# Patient Record
Sex: MALE | Race: WHITE | NOT HISPANIC OR LATINO | Employment: FULL TIME | RURAL
[De-identification: names, ages, dates, MRNs, and addresses within clinical notes are randomized per-mention and may not be internally consistent; named-entity substitution may affect disease eponyms.]

---

## 2023-02-17 RX ORDER — ATORVASTATIN CALCIUM 20 MG/1
20 TABLET, FILM COATED ORAL EVERY EVENING
COMMUNITY
Start: 2023-02-10 | End: 2023-03-01 | Stop reason: ALTCHOICE

## 2023-02-17 RX ORDER — METOPROLOL SUCCINATE 50 MG/1
1 TABLET, EXTENDED RELEASE ORAL DAILY
COMMUNITY
Start: 2023-01-10 | End: 2023-02-17 | Stop reason: SDUPTHER

## 2023-02-17 RX ORDER — TORSEMIDE 20 MG/1
TABLET ORAL
COMMUNITY
Start: 2023-02-06

## 2023-02-17 RX ORDER — METOPROLOL SUCCINATE 50 MG/1
50 TABLET, EXTENDED RELEASE ORAL DAILY
Qty: 90 TABLET | Refills: 3 | Status: SHIPPED | OUTPATIENT
Start: 2023-02-17 | End: 2023-03-01 | Stop reason: ALTCHOICE

## 2023-02-17 RX ORDER — POTASSIUM CHLORIDE 20 MEQ/1
1 TABLET, EXTENDED RELEASE ORAL DAILY
COMMUNITY
Start: 2023-02-09 | End: 2023-03-01 | Stop reason: ALTCHOICE

## 2023-02-17 RX ORDER — SPIRONOLACTONE 25 MG/1
1 TABLET ORAL DAILY
COMMUNITY
Start: 2023-02-10

## 2023-02-17 RX ORDER — BLOOD SUGAR DIAGNOSTIC
1 STRIP MISCELLANEOUS 4 TIMES DAILY
COMMUNITY
Start: 2022-12-26 | End: 2023-03-01 | Stop reason: ALTCHOICE

## 2023-03-01 ENCOUNTER — OFFICE VISIT (OUTPATIENT)
Dept: CARDIOLOGY | Facility: CLINIC | Age: 58
End: 2023-03-01
Payer: COMMERCIAL

## 2023-03-01 VITALS
SYSTOLIC BLOOD PRESSURE: 132 MMHG | DIASTOLIC BLOOD PRESSURE: 70 MMHG | WEIGHT: 234 LBS | BODY MASS INDEX: 33.5 KG/M2 | OXYGEN SATURATION: 93 % | HEART RATE: 88 BPM | HEIGHT: 70 IN

## 2023-03-01 DIAGNOSIS — I27.20 PULMONARY HYPERTENSION: ICD-10-CM

## 2023-03-01 DIAGNOSIS — I47.1 PAROXYSMAL SVT (SUPRAVENTRICULAR TACHYCARDIA): Primary | ICD-10-CM

## 2023-03-01 PROBLEM — J96.01 ACUTE RESPIRATORY FAILURE WITH HYPOXIA: Status: ACTIVE | Noted: 2022-06-17

## 2023-03-01 PROBLEM — J39.8 TRACHEAL STENOSIS: Status: ACTIVE | Noted: 2022-10-14

## 2023-03-01 PROBLEM — Z87.891 EX-SMOKER: Status: ACTIVE | Noted: 2023-03-01

## 2023-03-01 PROBLEM — J38.6 SUBGLOTTIC STENOSIS: Status: ACTIVE | Noted: 2022-09-02

## 2023-03-01 PROBLEM — I47.10 PAROXYSMAL SVT (SUPRAVENTRICULAR TACHYCARDIA): Status: ACTIVE | Noted: 2023-03-01

## 2023-03-01 PROCEDURE — 99203 OFFICE O/P NEW LOW 30 MIN: CPT | Performed by: NURSE PRACTITIONER

## 2023-03-01 RX ORDER — ATORVASTATIN CALCIUM 20 MG/1
20 TABLET, FILM COATED ORAL DAILY
COMMUNITY

## 2023-03-01 RX ORDER — METOPROLOL SUCCINATE 50 MG/1
50 TABLET, EXTENDED RELEASE ORAL DAILY
Qty: 90 TABLET | Refills: 1 | Status: SHIPPED | OUTPATIENT
Start: 2023-03-01

## 2023-03-01 RX ORDER — POTASSIUM CHLORIDE 20 MEQ/1
40 TABLET, EXTENDED RELEASE ORAL
COMMUNITY

## 2023-03-01 RX ORDER — INSULIN GLARGINE 100 [IU]/ML
10 INJECTION, SOLUTION SUBCUTANEOUS
COMMUNITY
Start: 2022-07-16 | End: 2023-07-16

## 2023-03-01 NOTE — PROGRESS NOTES
[facility] Provider Note     Date:   2023   Name: Sloan Yee  :   1965  PCP: Diego Sawyer MD    Chief Complaint   Patient presents with   • Rapid Heart Rate     3 month follow up       Subjective     History of Present Illness  Sloan Yee is a 57 y.o. male who presents today for follow-up on nonsustained SVT.  He no longer has a trach but has a T-tube.  He denies any chest pain, shortness of air, dizziness, edema, palpitations, or syncope.  He reports that he feels the best he has felt in a long time.  In QRS he has a known history of hiatal hernia, COPD, hypertension, polycythemia, cirrhosis, right-sided heart failure, secondary pulmonary hypertension secondary to COPD.    He reports he stopped smoking since May of last year and feels great.  EKG 2022 sinus tachycardia 2022 echo from  EF 51% with grade 1 diastolic dysfunction.  Holter monitor August 3, 2022    No Known Allergies    Current Outpatient Medications:   •  atorvastatin (LIPITOR) 20 MG tablet, Take 1 tablet by mouth Daily., Disp: , Rfl:   •  insulin glargine (LANTUS, SEMGLEE) 100 UNIT/ML injection, Inject 10 Units under the skin into the appropriate area as directed., Disp: , Rfl:   •  metFORMIN (GLUCOPHAGE) 500 MG tablet, Take 1 tablet by mouth., Disp: , Rfl:   •  metoprolol succinate XL (TOPROL-XL) 50 MG 24 hr tablet, Take 1 tablet by mouth Daily., Disp: 90 tablet, Rfl: 1  •  potassium chloride (K-DUR,KLOR-CON) 20 MEQ CR tablet, Take 2 tablets by mouth., Disp: , Rfl:   •  spironolactone (ALDACTONE) 25 MG tablet, Take 1 tablet by mouth Daily., Disp: , Rfl:   •  torsemide (DEMADEX) 20 MG tablet, take 2 tablets by mouth in the morning, Disp: , Rfl:     Past Medical History:   Diagnosis Date   • Chronic systolic (congestive) heart failure (HCC)    • Cirrhosis (HCC)    • COPD (chronic obstructive pulmonary disease) (HCC)    • Essential (primary) hypertension    • Hiatal hernia    •  "Hypotension, unspecified    • Localized swelling, mass and lump, lower limb, bilateral    • Nicotine dependence, unspecified, uncomplicated    • Other emphysema (HCC)    • Other long term (current) drug therapy    • Polycythemia    • Pulmonary hypertension, unspecified (HCC)    • Right-sided heart failure (HCC)    • Secondary pulmonary arterial hypertension (HCC)     SCONDARY TO COPD   • SVT (supraventricular tachycardia) (HCC)    • Ventricular tachycardia       History reviewed. No pertinent surgical history.  Family History   Problem Relation Age of Onset   • Brain cancer Mother      Social History     Socioeconomic History   • Marital status: Single   • Number of children: 2   Tobacco Use   • Smoking status: Former     Packs/day: 1.00     Years: 37.00     Pack years: 37.00     Types: Cigarettes     Quit date: 2022     Years since quittin.8   Substance and Sexual Activity   • Alcohol use: Not Currently     Comment: RARE PER WEEK   • Drug use: Not Currently       Objective     Vital Signs:  /70 (BP Location: Left arm)   Pulse 88   Ht 177.8 cm (70\")   Wt 106 kg (234 lb)   SpO2 93%   BMI 33.58 kg/m²   Estimated body mass index is 33.58 kg/m² as calculated from the following:    Height as of this encounter: 177.8 cm (70\").    Weight as of this encounter: 106 kg (234 lb).             Physical Exam  Constitutional:       Appearance: Normal appearance. He is well-developed.   HENT:      Head: Normocephalic and atraumatic.   Eyes:      General: No scleral icterus.     Pupils: Pupils are equal, round, and reactive to light.   Neck:      Vascular: No carotid bruit.      Comments: T-Tube in place  Cardiovascular:      Rate and Rhythm: Normal rate and regular rhythm.      Pulses: Normal pulses.           Radial pulses are 2+ on the right side and 2+ on the left side.        Dorsalis pedis pulses are 2+ on the right side and 2+ on the left side.        Posterior tibial pulses are 2+ on the right side and 2+ " on the left side.      Heart sounds: Normal heart sounds. No murmur heard.  Pulmonary:      Breath sounds: Normal breath sounds. No wheezing or rhonchi.   Musculoskeletal:      Right lower leg: No edema.      Left lower leg: No edema.   Skin:     Capillary Refill: Capillary refill takes less than 2 seconds.      Coloration: Skin is not cyanotic.      Nails: There is no clubbing.   Neurological:      Mental Status: He is alert and oriented to person, place, and time.      Motor: No weakness.      Gait: Gait normal.   Psychiatric:         Mood and Affect: Mood normal.         Behavior: Behavior is cooperative.         Thought Content: Thought content normal.         Cognition and Memory: Memory normal.                     Assessment and Plan     Diagnoses and all orders for this visit:    1. Paroxysmal SVT (supraventricular tachycardia) (HCC) (Primary)  Assessment & Plan:  Unsustained SVT.  Doing well on metoprolol.  Plan to continue.  Old system says history of right-sided heart failure.  With a echo at  approximately November 2022?  We will plan to repeat in November or December of this year.    Orders:  -     metoprolol succinate XL (TOPROL-XL) 50 MG 24 hr tablet; Take 1 tablet by mouth Daily.  Dispense: 90 tablet; Refill: 1    2. Pulmonary hypertension (HCC)  Assessment & Plan:  Pulmonary hypertension likely secondary to COPD.  We will update echo in November or December.  On diuretics.    Orders:  -     metoprolol succinate XL (TOPROL-XL) 50 MG 24 hr tablet; Take 1 tablet by mouth Daily.  Dispense: 90 tablet; Refill: 1      Recommendations: ER if symptoms increase, Sleep hygiene discussed, Stop cigarettes, Limit caffeine and Report if any new/changing symptoms immediately          Follow Up  Return in about 6 months (around 9/1/2023) for nSVT.  Patient was given instructions and counseling regarding his condition or for health maintenance advice. Please see specific information pulled into the AVS if  appropriate.

## 2023-03-01 NOTE — ASSESSMENT & PLAN NOTE
Unsustained SVT.  Doing well on metoprolol.  Plan to continue.  Old system says history of right-sided heart failure.  With a echo at  approximately November 2022?  We will plan to repeat in November or December of this year.

## 2023-03-01 NOTE — ASSESSMENT & PLAN NOTE
Pulmonary hypertension likely secondary to COPD.  We will update echo in November or December.  On diuretics.

## 2023-09-12 ENCOUNTER — OFFICE VISIT (OUTPATIENT)
Dept: CARDIOLOGY | Facility: CLINIC | Age: 58
End: 2023-09-12
Payer: COMMERCIAL

## 2023-09-12 VITALS
OXYGEN SATURATION: 91 % | DIASTOLIC BLOOD PRESSURE: 72 MMHG | SYSTOLIC BLOOD PRESSURE: 102 MMHG | HEIGHT: 70 IN | BODY MASS INDEX: 34.07 KG/M2 | HEART RATE: 81 BPM | WEIGHT: 238 LBS

## 2023-09-12 DIAGNOSIS — I27.20 PULMONARY HYPERTENSION: ICD-10-CM

## 2023-09-12 DIAGNOSIS — I47.1 PAROXYSMAL SVT (SUPRAVENTRICULAR TACHYCARDIA): ICD-10-CM

## 2023-09-12 PROCEDURE — 99214 OFFICE O/P EST MOD 30 MIN: CPT | Performed by: NURSE PRACTITIONER

## 2023-09-12 RX ORDER — TORSEMIDE 20 MG/1
TABLET ORAL
Qty: 180 TABLET | Refills: 1 | Status: SHIPPED | OUTPATIENT
Start: 2023-09-12

## 2023-09-12 RX ORDER — ATORVASTATIN CALCIUM 20 MG/1
20 TABLET, FILM COATED ORAL DAILY
Qty: 90 TABLET | Refills: 1 | Status: SHIPPED | OUTPATIENT
Start: 2023-09-12

## 2023-09-12 RX ORDER — POTASSIUM CHLORIDE 20 MEQ/1
40 TABLET, EXTENDED RELEASE ORAL DAILY
Qty: 180 TABLET | Refills: 1 | Status: SHIPPED | OUTPATIENT
Start: 2023-09-12

## 2023-09-12 RX ORDER — METOPROLOL SUCCINATE 50 MG/1
50 TABLET, EXTENDED RELEASE ORAL DAILY
Qty: 90 TABLET | Refills: 1 | Status: SHIPPED | OUTPATIENT
Start: 2023-09-12

## 2023-09-12 RX ORDER — SPIRONOLACTONE 25 MG/1
25 TABLET ORAL DAILY
Qty: 90 TABLET | Refills: 1 | Status: SHIPPED | OUTPATIENT
Start: 2023-09-12

## 2023-09-12 NOTE — PROGRESS NOTES
[facility] Provider Note     Date:   2023   Name: Sloan Yee  :   1965  PCP: Diego Sawyer MD    Chief Complaint   Patient presents with    Follow-up     SVT       Subjective     History of Present Illness  Sloan Yee is a 58 y.o. male who presents today for follow-up on nonsustained SVT.   ENT switches him back from her trach and T-tube.  He currently has T-tube in place.  He denies any chest pain, shortness of air, dizziness, edema, palpitations, or syncope.    Blood pressure little low today but patient denies any symptoms.  Heart rate at goal.  He is still taking potassium with his diuretics but has not had labs in over a year.  I offered him lab work today but he reports he really needs to get into see Dr. Sawyer.  I strongly encouraged him to make a follow-up with Dr. Sawyer in the next few weeks to obtain labs.    It is unclear if  will be ordering and following echo or if we will.  Next echo due around 2023.  If patient has not had repeat echo when we see him back at follow-up we will order it at that time.    Cardiology and sleep related problem list    1.  Nonsustained SVT  2.  Trach/T-tube  3.  Stop smoking in May 2022  4.  Hypertension  5.  History of right-sided heart failure  6.  Secondary pulmonary hypertension R/T COPD    2023 EKG for surgery at     2022 echo from -EF 51% with grade 1 diastolic dysfunction.     CCTA.  Mild coronary calcification with Charmaine score of 63.  Vascular age 71.  Mild stenosis in the proximal LAD due to calcified plaque.  Minimal stenosis in the mid LAD.  CAD RADS 2.  Dilated right ventricle and right atrium.  The main pulmonary artery is dilated 4.5 x 4.2 cm.  Left peribronchial calcified lymph nodes.    8/3/2022 Holter    No Known Allergies    Current Outpatient Medications:     atorvastatin (LIPITOR) 20 MG tablet, Take 1 tablet by mouth Daily., Disp: 90 tablet, Rfl: 1    metFORMIN  (GLUCOPHAGE) 500 MG tablet, Take 1 tablet by mouth., Disp: , Rfl:     metoprolol succinate XL (TOPROL-XL) 50 MG 24 hr tablet, Take 1 tablet by mouth Daily., Disp: 90 tablet, Rfl: 1    potassium chloride (K-DUR,KLOR-CON) 20 MEQ CR tablet, Take 2 tablets by mouth Daily., Disp: 180 tablet, Rfl: 1    spironolactone (ALDACTONE) 25 MG tablet, Take 1 tablet by mouth Daily., Disp: 90 tablet, Rfl: 1    torsemide (DEMADEX) 20 MG tablet, Take 2 tablets by mouth in the morning, Disp: 180 tablet, Rfl: 1    insulin glargine (LANTUS, SEMGLEE) 100 UNIT/ML injection, Inject 10 Units under the skin into the appropriate area as directed., Disp: , Rfl:     Past Medical History:   Diagnosis Date    Chronic systolic (congestive) heart failure     Cirrhosis     COPD (chronic obstructive pulmonary disease)     Essential (primary) hypertension     Hiatal hernia     Hypotension, unspecified     Localized swelling, mass and lump, lower limb, bilateral     Nicotine dependence, unspecified, uncomplicated     Other emphysema     Other long term (current) drug therapy     Polycythemia     Pulmonary hypertension, unspecified     Right-sided heart failure     Secondary pulmonary arterial hypertension     SCONDARY TO COPD    SVT (supraventricular tachycardia)     Ventricular tachycardia       History reviewed. No pertinent surgical history.  Family History   Problem Relation Age of Onset    Brain cancer Mother      Social History     Socioeconomic History    Marital status: Single    Number of children: 2   Tobacco Use    Smoking status: Former     Packs/day: 1.00     Years: 37.00     Pack years: 37.00     Types: Cigarettes     Quit date: 2022     Years since quittin.3     Passive exposure: Past    Smokeless tobacco: Never   Vaping Use    Vaping Use: Never used   Substance and Sexual Activity    Alcohol use: Not Currently     Comment: RARE PER WEEK    Drug use: Not Currently    Sexual activity: Defer       Objective     Vital Signs:  BP  "102/72 (BP Location: Left arm)   Pulse 81   Ht 177.8 cm (70\")   Wt 108 kg (238 lb)   SpO2 91%   BMI 34.15 kg/m²   Estimated body mass index is 34.15 kg/m² as calculated from the following:    Height as of this encounter: 177.8 cm (70\").    Weight as of this encounter: 108 kg (238 lb).             Physical Exam  Vitals reviewed.   Constitutional:       Appearance: Normal appearance. He is well-developed.   HENT:      Head: Normocephalic and atraumatic.      Comments: T-Tube in place  Eyes:      General: No scleral icterus.     Pupils: Pupils are equal, round, and reactive to light.   Neck:      Vascular: No carotid bruit.   Cardiovascular:      Rate and Rhythm: Normal rate and regular rhythm.      Pulses: Normal pulses.           Radial pulses are 2+ on the right side and 2+ on the left side.        Dorsalis pedis pulses are 2+ on the right side and 2+ on the left side.        Posterior tibial pulses are 2+ on the right side and 2+ on the left side.      Heart sounds: Normal heart sounds. No murmur heard.  Pulmonary:      Breath sounds: Normal breath sounds. No wheezing or rhonchi.   Musculoskeletal:         General: Normal range of motion.      Right lower leg: No edema.      Left lower leg: No edema.   Skin:     General: Skin is warm and dry.      Capillary Refill: Capillary refill takes less than 2 seconds.      Coloration: Skin is not cyanotic.      Nails: There is no clubbing.   Neurological:      Mental Status: He is alert and oriented to person, place, and time.      Motor: No weakness.      Gait: Gait normal.   Psychiatric:         Mood and Affect: Mood normal.         Behavior: Behavior is cooperative.         Thought Content: Thought content normal.         Cognition and Memory: Memory normal.                   Assessment and Plan     Diagnoses and all orders for this visit:    1. Paroxysmal SVT (supraventricular tachycardia)  Comments:  Rate controlled.  Asymptomatic.  Continue metoprolol.  EKG at  " 4/19/2023  Orders:  -     metoprolol succinate XL (TOPROL-XL) 50 MG 24 hr tablet; Take 1 tablet by mouth Daily.  Dispense: 90 tablet; Refill: 1    2. Pulmonary hypertension  Comments:  Dilated right ventricle and right atrium.  Main pulmonary artery dilated.  Update echo around December 2023.  EF 51%  Orders:  -     metoprolol succinate XL (TOPROL-XL) 50 MG 24 hr tablet; Take 1 tablet by mouth Daily.  Dispense: 90 tablet; Refill: 1    Other orders  -     atorvastatin (LIPITOR) 20 MG tablet; Take 1 tablet by mouth Daily.  Dispense: 90 tablet; Refill: 1  -     potassium chloride (K-DUR,KLOR-CON) 20 MEQ CR tablet; Take 2 tablets by mouth Daily.  Dispense: 180 tablet; Refill: 1  -     spironolactone (ALDACTONE) 25 MG tablet; Take 1 tablet by mouth Daily.  Dispense: 90 tablet; Refill: 1  -     torsemide (DEMADEX) 20 MG tablet; Take 2 tablets by mouth in the morning  Dispense: 180 tablet; Refill: 1        Recommendations: ER if symptoms increase and Report if any new/changing symptoms immediately              Follow Up  Return in about 6 months (around 3/12/2024) for SVT.    Christiane Yee, APRTAINA   09/12/2023     Please note that this explicitly excludes time spent on other separate billable services such as performing procedures or test interpretation, when applicable.    This note was created using dictation software which occasionally transcribes nonsensical phrases. Please contact the provider if any clarification is needed.

## 2024-03-04 RX ORDER — TORSEMIDE 20 MG/1
TABLET ORAL
Qty: 180 TABLET | Refills: 0 | Status: SHIPPED | OUTPATIENT
Start: 2024-03-04

## 2024-03-12 ENCOUNTER — OFFICE VISIT (OUTPATIENT)
Dept: CARDIOLOGY | Facility: CLINIC | Age: 59
End: 2024-03-12
Payer: COMMERCIAL

## 2024-03-12 VITALS
HEIGHT: 70 IN | SYSTOLIC BLOOD PRESSURE: 132 MMHG | WEIGHT: 252 LBS | BODY MASS INDEX: 36.08 KG/M2 | DIASTOLIC BLOOD PRESSURE: 76 MMHG | HEART RATE: 86 BPM | OXYGEN SATURATION: 92 %

## 2024-03-12 DIAGNOSIS — E78.5 HYPERLIPIDEMIA LDL GOAL <100: ICD-10-CM

## 2024-03-12 DIAGNOSIS — I47.10 PAROXYSMAL SVT (SUPRAVENTRICULAR TACHYCARDIA): ICD-10-CM

## 2024-03-12 DIAGNOSIS — I27.20 PULMONARY HYPERTENSION: ICD-10-CM

## 2024-03-12 PROCEDURE — 99214 OFFICE O/P EST MOD 30 MIN: CPT | Performed by: NURSE PRACTITIONER

## 2024-03-13 ENCOUNTER — TELEPHONE (OUTPATIENT)
Dept: CARDIOLOGY | Facility: CLINIC | Age: 59
End: 2024-03-13
Payer: COMMERCIAL

## 2024-03-13 PROBLEM — I50.9 CONGESTIVE HEART FAILURE: Status: ACTIVE | Noted: 2023-12-13

## 2024-03-13 PROBLEM — E78.5 HYPERLIPIDEMIA LDL GOAL <100: Status: ACTIVE | Noted: 2024-03-13

## 2024-03-13 PROBLEM — K42.9 UMBILICAL HERNIA WITHOUT OBSTRUCTION AND WITHOUT GANGRENE: Status: ACTIVE | Noted: 2023-12-13

## 2024-03-13 PROBLEM — E13.9 DIABETES 1.5, MANAGED AS TYPE 2: Status: ACTIVE | Noted: 2023-12-13

## 2024-03-13 RX ORDER — METOPROLOL SUCCINATE 50 MG/1
50 TABLET, EXTENDED RELEASE ORAL DAILY
Qty: 90 TABLET | Refills: 1 | Status: SHIPPED | OUTPATIENT
Start: 2024-03-13

## 2024-03-13 RX ORDER — SPIRONOLACTONE 25 MG/1
25 TABLET ORAL DAILY
Qty: 90 TABLET | Refills: 1 | Status: SHIPPED | OUTPATIENT
Start: 2024-03-13

## 2024-03-13 RX ORDER — TORSEMIDE 20 MG/1
TABLET ORAL
Qty: 180 TABLET | Refills: 1 | Status: SHIPPED | OUTPATIENT
Start: 2024-03-13

## 2024-03-13 RX ORDER — POTASSIUM CHLORIDE 20 MEQ/1
40 TABLET, EXTENDED RELEASE ORAL DAILY
Qty: 180 TABLET | Refills: 1 | Status: SHIPPED | OUTPATIENT
Start: 2024-03-13

## 2024-03-13 NOTE — TELEPHONE ENCOUNTER
----- Message from Marjorie Berger MA sent at 3/13/2024 12:25 PM EDT -----  Regarding: RE: Lipids  Called PCP no lipidsd Pt was new 20222 not been back but once.  ----- Message -----  From: Christiane Yee APRN  Sent: 3/13/2024  12:16 PM EDT  To: [facility]  Subject: Lipids                                           Any lipids from PCP please.

## 2024-03-13 NOTE — PROGRESS NOTES
[facility] Provider Note     Date:   2024   Name: Sloan Yee  :   1965  PCP: Diego Sawyer MD    Chief Complaint   Patient presents with    Follow-up    Rapid Heart Rate    Hypertension       Subjective     History of Present Illness  Sloan Yee is a 58 y.o. male who presents today for follow-up on nonsustained SVT, hypertension, and right-sided heart failure.  He stopped smoking in May 2022.  I am very proud of him.    He declines updating his echo as he denies any chest pain, shortness of air, palpitations, edema, dizziness, or syncope.  He said he feels great.    We will need to get updated lipid results from PCP.    Blood pressure at goal.    Will see patient back in 6 months.    Cardiology and sleep related problem list    1.  Nonsustained SVT  2.  Trach/T-tube  3.  Stop smoking in May 2022  4.  Hypertension  5.  History of right-sided heart failure  6.  Secondary pulmonary hypertension R/T COPD    2023 EKG for surgery at     2022 echo from -EF 51% with grade 1 diastolic dysfunction.     CCTA.  Mild coronary calcification with Charmaine score of 63.  Vascular age 71.  Mild stenosis in the proximal LAD due to calcified plaque.  Minimal stenosis in the mid LAD.  CAD RADS 2.  Dilated right ventricle and right atrium.  The main pulmonary artery is dilated 4.5 x 4.2 cm.  Left peribronchial calcified lymph nodes.    8/3/2022 Holter    No Known Allergies    Current Outpatient Medications:     atorvastatin (LIPITOR) 20 MG tablet, Take 1 tablet by mouth Daily., Disp: 90 tablet, Rfl: 1    metFORMIN (GLUCOPHAGE) 500 MG tablet, Take 1 tablet by mouth., Disp: , Rfl:     metoprolol succinate XL (TOPROL-XL) 50 MG 24 hr tablet, Take 1 tablet by mouth Daily., Disp: 90 tablet, Rfl: 1    potassium chloride (K-DUR,KLOR-CON) 20 MEQ CR tablet, Take 2 tablets by mouth Daily., Disp: 180 tablet, Rfl: 1    spironolactone (ALDACTONE) 25 MG tablet, Take 1 tablet  "by mouth Daily., Disp: 90 tablet, Rfl: 1    torsemide (DEMADEX) 20 MG tablet, TAKE 2 TABLETS BY MOUTH IN THE MORNING, Disp: 180 tablet, Rfl: 0    insulin glargine (LANTUS, SEMGLEE) 100 UNIT/ML injection, Inject 10 Units under the skin into the appropriate area as directed., Disp: , Rfl:     Past Medical History:   Diagnosis Date    Chronic systolic (congestive) heart failure     Cirrhosis     COPD (chronic obstructive pulmonary disease)     Essential (primary) hypertension     Hiatal hernia     Hypotension, unspecified     Localized swelling, mass and lump, lower limb, bilateral     Nicotine dependence, unspecified, uncomplicated     Other emphysema     Other long term (current) drug therapy     Polycythemia     Pulmonary hypertension, unspecified     Right-sided heart failure     Secondary pulmonary arterial hypertension     SCONDARY TO COPD    Ventricular tachycardia       Past Surgical History:   Procedure Laterality Date    TRACHELECTOMY       Family History   Problem Relation Age of Onset    Brain cancer Mother      Social History     Socioeconomic History    Marital status: Single    Number of children: 2   Tobacco Use    Smoking status: Former     Current packs/day: 0.00     Average packs/day: 1 pack/day for 37.0 years (37.0 ttl pk-yrs)     Types: Cigarettes     Start date: 1985     Quit date: 2022     Years since quittin.8     Passive exposure: Past    Smokeless tobacco: Never   Vaping Use    Vaping status: Never Used   Substance and Sexual Activity    Alcohol use: Not Currently     Comment: RARE PER WEEK    Drug use: Not Currently    Sexual activity: Defer       Objective     Vital Signs:  /76   Pulse 86   Ht 177.8 cm (70\")   Wt 114 kg (252 lb)   SpO2 92%   BMI 36.16 kg/m²   Estimated body mass index is 36.16 kg/m² as calculated from the following:    Height as of this encounter: 177.8 cm (70\").    Weight as of this encounter: 114 kg (252 lb).         Physical Exam  Vitals reviewed. "   Constitutional:       Appearance: Normal appearance.   Eyes:      Pupils: Pupils are equal, round, and reactive to light.   Neck:      Vascular: No carotid bruit.   Cardiovascular:      Rate and Rhythm: Normal rate and regular rhythm.      Pulses: Normal pulses.      Heart sounds: Normal heart sounds.   Pulmonary:      Effort: Pulmonary effort is normal.      Breath sounds: Normal breath sounds.   Musculoskeletal:         General: Normal range of motion.      Right lower leg: No edema.      Left lower leg: No edema.   Skin:     General: Skin is warm and dry.   Neurological:      Mental Status: He is alert and oriented to person, place, and time.      Gait: Gait is intact.   Psychiatric:         Attention and Perception: Attention normal.         Mood and Affect: Mood normal.         Thought Content: Thought content normal.                     Assessment and Plan     Diagnoses and all orders for this visit:    1. Paroxysmal SVT (supraventricular tachycardia)  Comments:  Asymptomatic and rate controlled.  Patient declines updating echo.    2. Pulmonary hypertension  Comments:  Blood pressure at goal.  EKG up-to-date.  Patient declines updating echo.    3. Hyperlipidemia LDL goal <100  Comments:  Coexisting DM.  LL statin.  Obtain lab results from PCP.        Recommendations: ER if symptoms increase and Report if any new/changing symptoms immediately      Follow Up  Return in about 6 months (around 9/12/2024).    Christiane Yee, SARAH   03/12/2024     Please note that this explicitly excludes time spent on other separate billable services such as performing procedures or test interpretation, when applicable.    This note was created using dictation software which occasionally transcribes nonsensical phrases. Please contact the provider if any clarification is needed.

## 2024-06-18 RX ORDER — ATORVASTATIN CALCIUM 20 MG/1
20 TABLET, FILM COATED ORAL DAILY
Qty: 90 TABLET | Refills: 0 | Status: SHIPPED | OUTPATIENT
Start: 2024-06-18

## 2024-09-13 ENCOUNTER — OFFICE VISIT (OUTPATIENT)
Dept: CARDIOLOGY | Facility: CLINIC | Age: 59
End: 2024-09-13
Payer: COMMERCIAL

## 2024-09-13 VITALS
SYSTOLIC BLOOD PRESSURE: 120 MMHG | WEIGHT: 261 LBS | HEIGHT: 70 IN | BODY MASS INDEX: 37.37 KG/M2 | OXYGEN SATURATION: 93 % | HEART RATE: 72 BPM | DIASTOLIC BLOOD PRESSURE: 74 MMHG

## 2024-09-13 DIAGNOSIS — I27.20 PULMONARY HYPERTENSION: ICD-10-CM

## 2024-09-13 DIAGNOSIS — I47.10 PAROXYSMAL SVT (SUPRAVENTRICULAR TACHYCARDIA): Primary | ICD-10-CM

## 2024-09-13 NOTE — PROGRESS NOTES
[facility] Provider Note     Date:   2024  Name: Sloan Yee  :   1965  PCP: Diego Sawyer MD    Chief Complaint   Patient presents with    Rapid Heart Rate    Hypertension       Subjective     History of Present Illness  Sloan Yee is a 59 y.o. male who presents today for follow up.    Patient states that he continues to do very well.  He states his been 2 years since he has smoked.  He states that his blood pressure has been well-controlled.  He states he has been working all day and has had no swelling in his lower extremities.  He states that he would like to try to stop taking some of his medications torsemide being one of them.  I have discussed with patient that he can try to stop taking the torsemide, but if his symptoms return that he should restart it as soon as possible.    Cardiology and sleep related problem list    1.  Nonsustained SVT  2.  Trach/T-tube  3.  Stop smoking in May 2022  4.  Hypertension  5.  History of right-sided heart failure  6.  Secondary pulmonary hypertension R/T COPD    24 EKG sinus rhythm    2023 EKG for surgery at     2022 echo from -EF 51% with grade 1 diastolic dysfunction.     CCTA.  Mild coronary calcification with Charmaine score of 63.  Vascular age 71.  Mild stenosis in the proximal LAD due to calcified plaque.  Minimal stenosis in the mid LAD.  CAD RADS 2.  Dilated right ventricle and right atrium.  The main pulmonary artery is dilated 4.5 x 4.2 cm.  Left peribronchial calcified lymph nodes.    8/3/2022 Holter     Reports Denies   Chest Pain [] [x]   Shortness of Air [] [x]   Palpitations [] [x]   Edema [] [x]   Dizziness [] [x]   Syncope [] [x]       No Known Allergies    Current Outpatient Medications:     atorvastatin (LIPITOR) 20 MG tablet, Take 1 tablet by mouth once daily, Disp: 90 tablet, Rfl: 0    metFORMIN (GLUCOPHAGE) 500 MG tablet, Take 1 tablet by mouth., Disp: , Rfl:      metoprolol succinate XL (TOPROL-XL) 50 MG 24 hr tablet, Take 1 tablet by mouth Daily., Disp: 90 tablet, Rfl: 1    potassium chloride (KLOR-CON M20) 20 MEQ CR tablet, Take 2 tablets by mouth Daily., Disp: 180 tablet, Rfl: 1    spironolactone (ALDACTONE) 25 MG tablet, Take 1 tablet by mouth Daily., Disp: 90 tablet, Rfl: 1    torsemide (DEMADEX) 20 MG tablet, Take 2 tablets by mouth in the morning, Disp: 180 tablet, Rfl: 1    insulin glargine (LANTUS, SEMGLEE) 100 UNIT/ML injection, Inject 10 Units under the skin into the appropriate area as directed., Disp: , Rfl:     Past Medical History:   Diagnosis Date    Chronic systolic (congestive) heart failure     Cirrhosis     COPD (chronic obstructive pulmonary disease)     Essential (primary) hypertension     Hiatal hernia     Hypotension, unspecified     Localized swelling, mass and lump, lower limb, bilateral     Nicotine dependence, unspecified, uncomplicated     Other emphysema     Other long term (current) drug therapy     Polycythemia     Pulmonary hypertension, unspecified     Right-sided heart failure     Secondary pulmonary arterial hypertension     SCONDARY TO COPD    Ventricular tachycardia       Past Surgical History:   Procedure Laterality Date    TRACHELECTOMY       Family History   Problem Relation Age of Onset    Brain cancer Mother      Social History     Socioeconomic History    Marital status: Single    Number of children: 2   Tobacco Use    Smoking status: Former     Current packs/day: 0.00     Average packs/day: 1 pack/day for 37.0 years (37.0 ttl pk-yrs)     Types: Cigarettes     Start date: 1985     Quit date: 2022     Years since quittin.3     Passive exposure: Past    Smokeless tobacco: Never   Vaping Use    Vaping status: Never Used   Substance and Sexual Activity    Alcohol use: Not Currently     Comment: RARE PER WEEK    Drug use: Not Currently    Sexual activity: Defer       Objective     Vital Signs:  /74   Pulse 72   Ht  "177.8 cm (70\")   Wt 118 kg (261 lb)   SpO2 93%   BMI 37.45 kg/m²   Estimated body mass index is 37.45 kg/m² as calculated from the following:    Height as of this encounter: 177.8 cm (70\").    Weight as of this encounter: 118 kg (261 lb).             Physical Exam  Constitutional:       Appearance: Normal appearance. He is obese.   Cardiovascular:      Rate and Rhythm: Normal rate and regular rhythm.   Pulmonary:      Effort: Pulmonary effort is normal.      Breath sounds: Normal breath sounds.   Skin:     General: Skin is warm and dry.      Capillary Refill: Capillary refill takes less than 2 seconds.   Neurological:      General: No focal deficit present.      Mental Status: He is alert and oriented to person, place, and time.   Psychiatric:         Mood and Affect: Mood normal.         Behavior: Behavior normal.         Thought Content: Thought content normal.         Judgment: Judgment normal.                   ECG 12 Lead    Date/Time: 9/13/2024 2:48 PM  Performed by: Cheryl Llanes APRN    Authorized by: Cheryl Llanes APRN  Comparison: compared with previous ECG from 5/6/2023  Similar to previous ECG  Rhythm: sinus rhythm  Rate: normal  BPM: 68  Conduction: conduction normal  ST Segments: ST segments normal  T Waves: T waves normal  Other: no other findings    Clinical impression: normal ECG           Assessment and Plan     Diagnoses and all orders for this visit:    1. Paroxysmal SVT (supraventricular tachycardia) (Primary)  Assessment & Plan:  Nonsustained SVT.  Has been controlled with metoprolol.  Plan to continue.    History of right-sided heart failure.    Plan echo.    Orders:  -     ECG 12 Lead    2. Pulmonary hypertension  Assessment & Plan:  Pulmonary hypertension secondary to COPD.  Patient on torsemide and spironolactone.  Patient wishes to try and go off of the torsemide.  Have instructed patient that if he has recurrent symptoms to restart the torsemide as soon as possible.    Plan " echo    Orders:  -     Adult Transthoracic Echo Complete W/ Cont if Necessary Per Protocol; Future        Recommendations: ER if symptoms increase, Report if any new/changing symptoms immediately, and Limit salt          Follow Up  Return in about 6 months (around 3/13/2025) for Next scheduled follow up.  Patient was given instructions and counseling regarding his condition or for health maintenance advice. Please see specific information pulled into the AVS if appropriate.

## 2024-09-13 NOTE — ASSESSMENT & PLAN NOTE
Nonsustained SVT.  Has been controlled with metoprolol.  Plan to continue.    History of right-sided heart failure.    Plan echo.

## 2024-09-13 NOTE — ASSESSMENT & PLAN NOTE
Pulmonary hypertension secondary to COPD.  Patient on torsemide and spironolactone.  Patient wishes to try and go off of the torsemide.  Have instructed patient that if he has recurrent symptoms to restart the torsemide as soon as possible.    Plan echo

## 2024-09-16 ENCOUNTER — PATIENT ROUNDING (BHMG ONLY) (OUTPATIENT)
Dept: CARDIOLOGY | Facility: CLINIC | Age: 59
End: 2024-09-16
Payer: COMMERCIAL

## 2024-09-18 DIAGNOSIS — I47.10 PAROXYSMAL SVT (SUPRAVENTRICULAR TACHYCARDIA): ICD-10-CM

## 2024-09-18 DIAGNOSIS — E78.5 HYPERLIPIDEMIA LDL GOAL <100: Primary | ICD-10-CM

## 2024-09-18 RX ORDER — SPIRONOLACTONE 25 MG/1
25 TABLET ORAL DAILY
Qty: 90 TABLET | Refills: 0 | Status: SHIPPED | OUTPATIENT
Start: 2024-09-18

## 2024-09-18 RX ORDER — ATORVASTATIN CALCIUM 20 MG/1
20 TABLET, FILM COATED ORAL DAILY
Qty: 90 TABLET | Refills: 0 | Status: SHIPPED | OUTPATIENT
Start: 2024-09-18

## 2024-12-02 RX ORDER — TORSEMIDE 20 MG/1
TABLET ORAL
Qty: 180 TABLET | Refills: 0 | Status: SHIPPED | OUTPATIENT
Start: 2024-12-02

## 2024-12-13 RX ORDER — SPIRONOLACTONE 25 MG/1
25 TABLET ORAL DAILY
Qty: 90 TABLET | Refills: 0 | Status: SHIPPED | OUTPATIENT
Start: 2024-12-13

## 2024-12-13 RX ORDER — ATORVASTATIN CALCIUM 20 MG/1
20 TABLET, FILM COATED ORAL DAILY
Qty: 90 TABLET | Refills: 0 | Status: SHIPPED | OUTPATIENT
Start: 2024-12-13

## 2025-02-27 RX ORDER — TORSEMIDE 20 MG/1
TABLET ORAL
Qty: 180 TABLET | Refills: 0 | Status: SHIPPED | OUTPATIENT
Start: 2025-02-27

## 2025-03-14 ENCOUNTER — OFFICE VISIT (OUTPATIENT)
Dept: CARDIOLOGY | Facility: CLINIC | Age: 60
End: 2025-03-14
Payer: COMMERCIAL

## 2025-03-14 VITALS
HEART RATE: 87 BPM | OXYGEN SATURATION: 93 % | SYSTOLIC BLOOD PRESSURE: 108 MMHG | DIASTOLIC BLOOD PRESSURE: 64 MMHG | BODY MASS INDEX: 37.08 KG/M2 | HEIGHT: 70 IN | WEIGHT: 259 LBS

## 2025-03-14 DIAGNOSIS — E78.5 HYPERLIPIDEMIA LDL GOAL <100: ICD-10-CM

## 2025-03-14 DIAGNOSIS — I27.20 PULMONARY HYPERTENSION: Primary | Chronic | ICD-10-CM

## 2025-03-14 DIAGNOSIS — J39.8 TRACHEAL STENOSIS: ICD-10-CM

## 2025-03-14 PROCEDURE — 99213 OFFICE O/P EST LOW 20 MIN: CPT | Performed by: NURSE PRACTITIONER

## 2025-03-14 RX ORDER — ATORVASTATIN CALCIUM 20 MG/1
20 TABLET, FILM COATED ORAL DAILY
Qty: 90 TABLET | Refills: 0 | Status: SHIPPED | OUTPATIENT
Start: 2025-03-14

## 2025-03-14 RX ORDER — SPIRONOLACTONE 25 MG/1
25 TABLET ORAL DAILY
Qty: 90 TABLET | Refills: 0 | Status: SHIPPED | OUTPATIENT
Start: 2025-03-14

## 2025-03-14 NOTE — ASSESSMENT & PLAN NOTE
Pulmonary hypertension secondary to COPD.  Patient on torsemide and spironolactone.  He denies any lower extremity edema or worsening shortness of breath.

## 2025-03-14 NOTE — ASSESSMENT & PLAN NOTE
Lipid abnormalities are stable    Plan:  Continue same medication/s without change.      Discussed medication dosage, use, side effects, and goals of treatment in detail.    Counseled patient on lifestyle modifications to help control hyperlipidemia.     Patient Treatment Goals:   LDL goal is less than 70  LDL goal is under 100    Followup in 6 months.    Patient to continue his atorvastatin and complete his labs before his next scheduled appointment.

## 2025-05-01 DIAGNOSIS — I27.20 PULMONARY HYPERTENSION: ICD-10-CM

## 2025-05-01 DIAGNOSIS — I47.10 PAROXYSMAL SVT (SUPRAVENTRICULAR TACHYCARDIA): ICD-10-CM

## 2025-05-01 RX ORDER — METOPROLOL SUCCINATE 50 MG/1
50 TABLET, EXTENDED RELEASE ORAL DAILY
Qty: 90 TABLET | Refills: 0 | Status: SHIPPED | OUTPATIENT
Start: 2025-05-01

## 2025-07-26 DIAGNOSIS — I47.10 PAROXYSMAL SVT (SUPRAVENTRICULAR TACHYCARDIA): ICD-10-CM

## 2025-07-26 DIAGNOSIS — I27.20 PULMONARY HYPERTENSION: ICD-10-CM

## 2025-07-28 RX ORDER — METOPROLOL SUCCINATE 50 MG/1
50 TABLET, EXTENDED RELEASE ORAL DAILY
Qty: 90 TABLET | Refills: 0 | Status: SHIPPED | OUTPATIENT
Start: 2025-07-28

## 2025-07-29 NOTE — TELEPHONE ENCOUNTER
Called patient no VM to leave message.     Calling patient to see if he has had some recent labs if not we can order some. Where would he like them sent to. Labs for medication refill.

## 2025-07-31 NOTE — TELEPHONE ENCOUNTER
Called patient, no VM to leave message.   Called to see if patient has had labs completed and where. If not we can order some.

## 2025-08-01 RX ORDER — SPIRONOLACTONE 25 MG/1
25 TABLET ORAL DAILY
Qty: 90 TABLET | Refills: 0 | OUTPATIENT
Start: 2025-08-01